# Patient Record
Sex: MALE | Race: WHITE | Employment: FULL TIME | URBAN - METROPOLITAN AREA
[De-identification: names, ages, dates, MRNs, and addresses within clinical notes are randomized per-mention and may not be internally consistent; named-entity substitution may affect disease eponyms.]

---

## 2017-03-22 ENCOUNTER — EMPLOYEE WELLNESS (OUTPATIENT)
Dept: OTHER | Age: 57
End: 2017-03-22

## 2017-03-22 LAB
CHOLESTEROL: 286 MG/DL
GLUCOSE BLD-MCNC: 88 MG/DL (ref 70–140)
HDLC SERPL-MCNC: 42 MG/DL
LDL CHOLESTEROL CALCULATED: 191 MG/DL
PATIENT FASTING?: YES
TRIGL SERPL-MCNC: 265 MG/DL

## 2018-03-20 ENCOUNTER — EMPLOYEE WELLNESS (OUTPATIENT)
Dept: OTHER | Age: 58
End: 2018-03-20

## 2018-03-20 VITALS — BODY MASS INDEX: 29.38 KG/M2 | WEIGHT: 182 LBS

## 2018-03-20 LAB
CHOLESTEROL: 271 MG/DL
GLUCOSE BLD-MCNC: 85 MG/DL (ref 70–99)
HDLC SERPL-MCNC: 45 MG/DL
LDL CHOLESTEROL CALCULATED: 188 MG/DL
PATIENT FASTING?: YES
TRIGL SERPL-MCNC: 192 MG/DL

## 2018-04-16 VITALS — BODY MASS INDEX: 29.86 KG/M2 | WEIGHT: 185 LBS

## 2019-05-18 ENCOUNTER — EMPLOYEE WELLNESS (OUTPATIENT)
Dept: OTHER | Age: 59
End: 2019-05-18

## 2019-05-18 LAB
CHOLESTEROL: 256 MG/DL
GLUCOSE BLD-MCNC: 86 MG/DL (ref 70–99)
HDLC SERPL-MCNC: 49 MG/DL
LDL CHOLESTEROL CALCULATED: 172 MG/DL
PATIENT FASTING?: YES
TRIGL SERPL-MCNC: 177 MG/DL

## 2019-05-28 VITALS — BODY MASS INDEX: 28.89 KG/M2 | WEIGHT: 179 LBS

## 2019-07-03 ENCOUNTER — HOSPITAL ENCOUNTER (OUTPATIENT)
Age: 59
Discharge: HOME | End: 2019-07-03
Payer: COMMERCIAL

## 2019-07-03 ENCOUNTER — HOSPITAL ENCOUNTER (OUTPATIENT)
Dept: GENERAL RADIOLOGY | Age: 59
Discharge: HOME | End: 2019-07-03
Payer: COMMERCIAL

## 2019-07-03 DIAGNOSIS — M25.562 LEFT KNEE PAIN, UNSPECIFIED CHRONICITY: ICD-10-CM

## 2019-07-03 DIAGNOSIS — M25.561 RIGHT KNEE PAIN, UNSPECIFIED CHRONICITY: ICD-10-CM

## 2019-07-03 PROCEDURE — 73560 X-RAY EXAM OF KNEE 1 OR 2: CPT

## 2019-07-03 PROCEDURE — 73565 X-RAY EXAM OF KNEES: CPT

## 2025-01-24 ENCOUNTER — OFFICE VISIT (OUTPATIENT)
Dept: FAMILY MEDICINE CLINIC | Age: 65
End: 2025-01-24

## 2025-01-24 VITALS
SYSTOLIC BLOOD PRESSURE: 132 MMHG | TEMPERATURE: 97.4 F | DIASTOLIC BLOOD PRESSURE: 84 MMHG | BODY MASS INDEX: 29.95 KG/M2 | HEART RATE: 62 BPM | OXYGEN SATURATION: 96 % | RESPIRATION RATE: 18 BRPM | WEIGHT: 190.8 LBS | HEIGHT: 67 IN

## 2025-01-24 DIAGNOSIS — Z76.89 ENCOUNTER TO ESTABLISH CARE WITH NEW DOCTOR: Primary | ICD-10-CM

## 2025-01-24 DIAGNOSIS — Z12.11 COLON CANCER SCREENING: ICD-10-CM

## 2025-01-24 DIAGNOSIS — Z13.6 SCREENING FOR CARDIOVASCULAR CONDITION: ICD-10-CM

## 2025-01-24 DIAGNOSIS — J20.8 VIRAL BRONCHITIS: ICD-10-CM

## 2025-01-24 RX ORDER — PREDNISONE 20 MG/1
20 TABLET ORAL 2 TIMES DAILY
Qty: 6 TABLET | Refills: 0 | Status: SHIPPED | OUTPATIENT
Start: 2025-01-24 | End: 2025-01-27

## 2025-01-24 SDOH — ECONOMIC STABILITY: FOOD INSECURITY: WITHIN THE PAST 12 MONTHS, YOU WORRIED THAT YOUR FOOD WOULD RUN OUT BEFORE YOU GOT MONEY TO BUY MORE.: NEVER TRUE

## 2025-01-24 SDOH — ECONOMIC STABILITY: FOOD INSECURITY: WITHIN THE PAST 12 MONTHS, THE FOOD YOU BOUGHT JUST DIDN'T LAST AND YOU DIDN'T HAVE MONEY TO GET MORE.: NEVER TRUE

## 2025-01-24 ASSESSMENT — PATIENT HEALTH QUESTIONNAIRE - PHQ9
2. FEELING DOWN, DEPRESSED OR HOPELESS: NOT AT ALL
SUM OF ALL RESPONSES TO PHQ QUESTIONS 1-9: 0
SUM OF ALL RESPONSES TO PHQ9 QUESTIONS 1 & 2: 0
1. LITTLE INTEREST OR PLEASURE IN DOING THINGS: NOT AT ALL
SUM OF ALL RESPONSES TO PHQ QUESTIONS 1-9: 0

## 2025-01-24 NOTE — PROGRESS NOTES
1/24/2025    Willard Craven    Chief Complaint   Patient presents with    New Patient    Headache    Sinus Problem    Cough    Pharyngitis     Grandson dx'd with strep throat 3-4 days ago       HPI  History was obtained from pt.  Willard is a 64 y.o. male with a PMHx of  There is no problem list on file for this patient.     who presents today to establish care.    There is no problem list on file for this patient.      Specialists:  none    Acute Concerns:  History of Present Illness  The patient presents for evaluation of sore throat, sinus congestion, and body aches.    He has been experiencing a sore throat, sinus congestion, and body aches for several days, with symptoms beginning around the end of last week. He also reports a cough and chills but has not had a fever. His sleep is intermittent due to the cough, which he describes as hacking in nature. He reports no wheezing. He has been in contact with his grandson, who was recently diagnosed with strep throat, and his employer and his wife, who suspect they may have contracted COVID-19. He has been managing his symptoms with NyQuil during the day.    He has a history of asthma and reports a sensation of heaviness in his chest.    He has not had any recent blood work done. He had a colonoscopy a long time ago. 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 He quit smoking probably 10 or 15 years ago.    SOCIAL HISTORY  He has been clean from drug and alcohol for approximately 20 years. He quit smoking approximately 10 to 15 years ago.    FAMILY HISTORY  His mother has dementia. He has a family history of heart disease and blood pressure.    MEDICATIONS  NyQuil  Starting Monday or Tuesday or last week. Sore throat, headache, sinus congestion, coughing, no fevers, [place] is a sick contact with strep throat - taking dayquil.   Heavy chest, mild sob, no asthma.     Family History:  Mother - dementia, htn  Heart disease    Recent Labs:  none    Care

## 2025-01-24 NOTE — PATIENT INSTRUCTIONS
Welcome to [facility] :    Did you know we now have a faster way for you to move through your appointment? For your convenience, we now have digital registration available. When you schedule your next appointment, you will receive a link via your email as well as a text message that will allow you to complete any paperwork digitally before your appointment. We are committed to providing you the best care possible.    If you receive a survey after visiting one of our offices, please take time to share your experience concerning your physician office visit.  These surveys are confidential and no health information about you is shared.    We are eager to improve for you and continue to give you satisfactory care, we are counting on your feedback to help make that happen.

## 2025-01-31 ENCOUNTER — TELEPHONE (OUTPATIENT)
Dept: GASTROENTEROLOGY | Age: 65
End: 2025-01-31

## 2025-01-31 NOTE — TELEPHONE ENCOUNTER
Called and spoke to the patient regarding referral for colon screening. I informed the patient that he qualifies for open access, meaning I could just schedule the colonoscopy if he wanted. Patient stated he would like to continue as open access, but will get back with me after speaking to his wife to figure out what days she has off from work. Patient took down my information and will call back when ready to schedule.

## 2025-02-20 ENCOUNTER — TELEPHONE (OUTPATIENT)
Dept: GASTROENTEROLOGY | Age: 65
End: 2025-02-20

## 2025-02-26 ENCOUNTER — COMMUNITY OUTREACH (OUTPATIENT)
Dept: FAMILY MEDICINE CLINIC | Age: 65
End: 2025-02-26

## 2025-02-26 NOTE — PROGRESS NOTES
Patient's HM shows they are overdue for Colorectal Screening.   Care Everywhere and  files searched.  No results to attach to order nor HM updated.     Gastro referral placed 1/24/25, patient contact twice, no appointment scheduled.

## 2025-02-27 ENCOUNTER — TELEPHONE (OUTPATIENT)
Dept: FAMILY MEDICINE CLINIC | Age: 65
End: 2025-02-27

## 2025-02-27 NOTE — TELEPHONE ENCOUNTER
Patient:Willard Craven  : 1960  Referring Provider: SIMEON SUE  Referral Type:  Eval and Treat     Procedures:  REF25 (Custom) - AMB REFERRAL TO GASTROENTEROLOGY  Date Service Ordered 2025        We were unable to reach Willard Craven to schedule the test ordered by your office after 3 outreach attempts via either text, email and/or phone call.  Please call/follow up with your patient to explain the significance of the ordered test and direct the patient to call Central Scheduling to schedule the test at their earliest convenience.     Please complete one of the following actions from Quick Actions buttons:     Route to Provider:  Route message to ordering provider to seek next steps in care plan.     Telephone Encounter:  Telephone encounter will open.  Call patient to explain significance of ordered test and direct patient to call Central Scheduling to schedule test then Document details of call.     Open Referral:  Review referral notes or details if needed.     Close Referral:  Referral will open.  Document in Notes section of referral why the referral is being closed.  Examples of referral closure:  Patient had test done outside of of an office in the [facility], Patient refuses test, Patient no longer having symptoms, Unable to reach patient.  Only close the referral if you are sure the test will not proceed.     Thank you     Pre-Access Scheduling Team

## 2025-02-27 NOTE — TELEPHONE ENCOUNTER
Patient spoke to [facility] 2/20/25 & stated he needed to discuss colonoscopy with wife and he would call their office back once he decided if he would complete procedure or not.

## 2025-07-03 ENCOUNTER — TELEPHONE (OUTPATIENT)
Dept: FAMILY MEDICINE CLINIC | Age: 65
End: 2025-07-03

## 2025-07-03 NOTE — TELEPHONE ENCOUNTER
Spoke with pt's wife, Ruby(on communication form) and she advised that she just got done at the vet and was told that her dogs have lice/scabies or mange. Wanting to know if Jossue would send in a Rx for  as well since he has itchy rash. Requesting Rx be sent to [facility]